# Patient Record
Sex: FEMALE | Race: WHITE | Employment: OTHER | URBAN - METROPOLITAN AREA
[De-identification: names, ages, dates, MRNs, and addresses within clinical notes are randomized per-mention and may not be internally consistent; named-entity substitution may affect disease eponyms.]

---

## 2017-05-09 ENCOUNTER — OFFICE VISIT (OUTPATIENT)
Dept: OBGYN CLINIC | Age: 72
End: 2017-05-09

## 2017-05-09 VITALS
BODY MASS INDEX: 28.16 KG/M2 | DIASTOLIC BLOOD PRESSURE: 90 MMHG | WEIGHT: 153 LBS | HEART RATE: 63 BPM | SYSTOLIC BLOOD PRESSURE: 141 MMHG | HEIGHT: 62 IN

## 2017-05-09 VITALS
HEIGHT: 62 IN | DIASTOLIC BLOOD PRESSURE: 90 MMHG | HEART RATE: 63 BPM | WEIGHT: 153 LBS | SYSTOLIC BLOOD PRESSURE: 140 MMHG | BODY MASS INDEX: 28.16 KG/M2

## 2017-05-09 DIAGNOSIS — N95.0 POSTMENOPAUSAL VAGINAL BLEEDING: Primary | ICD-10-CM

## 2017-05-09 DIAGNOSIS — N95.2 VAGINAL ATROPHY: ICD-10-CM

## 2017-05-09 DIAGNOSIS — E66.3 OVERWEIGHT (BMI 25.0-29.9): ICD-10-CM

## 2017-05-09 RX ORDER — LEVOTHYROXINE SODIUM 100 UG/1
TABLET ORAL
COMMUNITY

## 2017-05-09 RX ORDER — CALCIUM CARBONATE 600 MG
600 TABLET ORAL 2 TIMES DAILY
COMMUNITY
End: 2017-05-09

## 2017-05-09 RX ORDER — BISMUTH SUBSALICYLATE 262 MG
1 TABLET,CHEWABLE ORAL DAILY
COMMUNITY
End: 2017-05-09

## 2017-05-09 RX ORDER — GLUCOSAMINE SULFATE 1500 MG
POWDER IN PACKET (EA) ORAL DAILY
COMMUNITY
End: 2017-05-09

## 2017-05-09 RX ORDER — BISMUTH SUBSALICYLATE 262 MG
1 TABLET,CHEWABLE ORAL DAILY
COMMUNITY
End: 2018-02-12

## 2017-05-09 RX ORDER — LEVOTHYROXINE SODIUM 100 UG/1
TABLET ORAL
COMMUNITY
End: 2017-05-09

## 2017-05-09 NOTE — MR AVS SNAPSHOT
Visit Information Date & Time Provider Department Dept. Phone Encounter #  
 5/9/2017  1:00 PM [phone] [id number] Upcoming Health Maintenance Date Due Hepatitis C Screening 1945 BREAST CANCER SCRN MAMMOGRAM 8/12/1995 FOBT Q 1 YEAR AGE 50-75 8/12/1995 ZOSTER VACCINE AGE 60> 8/12/2005 OSTEOPOROSIS SCREENING (DEXA) 8/12/2010 INFLUENZA AGE 9 TO ADULT 8/1/2017 Allergies as of 5/9/2017  Review Complete On: 5/9/2017 By: Irma Perkins DO No Known Allergies Current Immunizations  Never Reviewed No immunizations on file. Not reviewed this visit Vitals BP Pulse Height(growth percentile) Weight(growth percentile) BMI OB Status 140/90 63 5' 2\" (1.575 m) 153 lb (69.4 kg) 27.98 kg/m2 Postmenopausal  
 Smoking Status Never Smoker BMI and BSA Data Body Mass Index Body Surface Area  
 27.98 kg/m 2 1.74 m 2 Preferred Pharmacy Pharmacy Name Phone [address] [place] [address] [phone] Your Updated Medication List  
  
   
This list is accurate as of: 5/9/17  1:53 PM.  Always use your most recent med list.  
  
  
  
  
 CALCIUM 600 + D 600-125 mg-unit Tab Generic drug:  calcium-cholecalciferol (d3) Take  by mouth.  
  
 levothyroxine 100 mcg tablet Commonly known as:  SYNTHROID Take  by mouth Daily (before breakfast). multivitamin tablet Commonly known as:  ONE A DAY Take 1 Tab by mouth daily. Introducing [facility]! [employer] introduces POPAPP patient portal. Now you can access parts of your medical record, email your doctor's office, and request medication refills online. 1. In your internet browser, go to [email or web address] Buzz All [email or web address]. [email or web address] Mediat 2. Click on the First Time User? Click Here link in the Sign In box. You will see the New Member Sign Up page. 3. Enter your Gullivearth Access Code exactly as it appears below. You will not need to use this code after youve completed the sign-up process. If you do not sign up before the expiration date, you must request a new code. · Gullivearth Access Code: [email or web address] Expires: 8/7/2017  9:59 AM 
 
4. Enter the last four digits of your Social Security Number (xxxx) and Date of Birth (mm/dd/yyyy) as indicated and click Submit. You will be taken to the next sign-up page. 5. Create a Gullivearth ID. This will be your Gullivearth login ID and cannot be changed, so think of one that is secure and easy to remember. 6. Create a Gullivearth password. You can change your password at any time. 7. Enter your Password Reset Question and Answer. This can be used at a later time if you forget your password. 8. Enter your e-mail address. You will receive e-mail notification when new information is available in [address]. 9. Click Sign Up. You can now view and download portions of your medical record. 10. Click the Download Summary menu link to download a portable copy of your medical information. If you have questions, please visit the Frequently Asked Questions section of the Gullivearth website. Remember, Gullivearth is NOT to be used for urgent needs. For medical emergencies, dial 911. Now available from your iPhone and Android! Please provide this summary of care documentation to your next provider. If you have any questions after today's visit, please call [phone].

## 2017-05-09 NOTE — PROGRESS NOTES
Mickey President is a 70 y.o. female referred by PMD for management of abnormal vaginal bleeding. Patient reports 2 mos of daily vaginal bleeding with wiping and sometimes seen in the toilet. Associated with small clots. Not enough to wear pad. No pelvic pain. Menopause in late 45s. No postcoital bleeding. Uses otc lubricant prn. Review of Systems:   General ROS: negative for - fever, chills, malaise  Endocrine ROS: negative for -  breast tenderness/mass/nipple discharge/galactorrhea, hair pattern changes, skin changes or temperature intolerance. Respiratory ROS: no cough, shortness of breath, or wheezing  Cardiovascular ROS: no chest pain or dyspnea on exertion  Gastrointestinal ROS: no abdominal pain, change in bowel habits, or black or bloody stools, nausea, vomiting  Genito-Urinary ROS: no dysuria, trouble voiding, incontinence or hematuria. No pelvic pain, unusual discharge, sx of pelvic organ prolapse. Positive for AUB as above and  vaginal dryness    Musculoskeletal ROS: negative  Neurological ROS: no TIA or stroke symptoms  Dermatological ROS: negative    OB History      Para Term  AB TAB SAB Ectopic Multiple Living    3 2 2  1  1           Gyn hx:  Menarche at age 15. Postmenopausal in 45s. No hx of abnormal pap. Last pap:  2016. Mammogram up to date. Colonoscopy due. History reviewed. No pertinent past medical history. History reviewed. No pertinent surgical history. Social History     Social History    Marital status:      Spouse name: N/A    Number of children: N/A    Years of education: N/A     Occupational History    Not on file.      Social History Main Topics    Smoking status: Never Smoker    Smokeless tobacco: Never Used    Alcohol use Yes    Drug use: Not on file    Sexual activity: Yes     Other Topics Concern    Not on file     Social History Narrative     No Known Allergies  Prior to Admission medications    Medication Sig Start Date End Date Taking? Authorizing Provider   levothyroxine (SYNTHROID) 100 mcg tablet Take  by mouth Daily (before breakfast). Yes Historical Provider   calcium-cholecalciferol, d3, (CALCIUM 600 + D) 600-125 mg-unit tab Take  by mouth. Yes Historical Provider   multivitamin (ONE A DAY) tablet Take 1 Tab by mouth daily. Yes Historical Provider        Objective:     Vitals:    05/09/17 1309   BP: 140/90   Pulse: 63   Weight: 153 lb (69.4 kg)   Height: 5' 2\" (1.575 m)     Body mass index is 27.98 kg/(m^2). Physical Exam:  General appearance - well appearing, and in no distress and well hydrated  Mental status - alert, oriented to person, place, and time, normal mood, behavior, speech, dress, motor activity, and thought processes  Respiratory:  Normal respiratory effort, no intercostal retractions or use of accessory muscles. Abdomen - soft, nontender, nondistended, no masses or organomegaly  Pelvic - No inguinal lymphadenopathy, normal urethral meatus and no bladder tenderness. VULVA: calcified varicosities on right labia, otherwise normal appearing vulva with no masses, tenderness or lesions, VAGINA: atrophic with scant rust-brown discharge, no lesions, PELVIC FLOOR EXAM: relaxed vaginal wall laterally. No uterine descent, cystocele and rectocele noted,  CERVIX: normal appearing cervix without lesions, scant rust-brown blood slowly oozing from endocervical canal UTERUS: uterus is normal size, shape, consistency and nontender, mobile, ADNEXA: non-palpable. Extremities - No edema. Skin - normal coloration and turgor, no rashes, no suspicious skin lesions noted    Assessment/Plan:       ICD-10-CM ICD-9-CM    1. Postmenopausal vaginal bleeding N95.0 627.1 US PELV NON OB W TV   2. Vaginal atrophy N95.2 627.3 US PELV NON OB W TV   3. Overweight (BMI 25.0-29. 9) E66.3 278.02 US PELV NON OB W TV     DDX of AUB using PALM COEIN classification discussed.  We focussed our discussion on hormonal fluctuations especially as it relates to obesity/peripheral conversion of estrogen and its association with endometrial hyperplasia/malignancy as well as structural abnormalities such as polyps/fibroids  Pelvic US and EMB recommended. We briefly discussed observation vs medical vs surgical management based on current symptoms.  Long term management will depend on results. Caloric restrictions and regular exercise strongly encouraged. Plan of care discussed. Patient expressed understanding and agreement.              Signed By:  Jer Wihte DO     May 9, 2017

## 2017-05-09 NOTE — PATIENT INSTRUCTIONS
Vaginal Bleeding After Menopause: Care Instructions  Your Care Instructions  Vaginal bleeding after menopause can have many causes, including infection, inflammation, prescription hormones, abnormal growths, and injury. Your doctor may want you to have more tests to find the cause of your vaginal bleeding. Follow-up care is a key part of your treatment and safety. Be sure to make and go to all appointments, and call your doctor if you are having problems. It's also a good idea to know your test results and keep a list of the medicines you take. How can you care for yourself at home? · If your doctor gave you medicine, take it exactly as prescribed. Call your doctor if you think you are having a problem with your medicine. · Do not have sex or put anything inside your vagina until you talk with your doctor. · Do not douche. When should you call for help? Call 911 anytime you think you may need emergency care. For example, call if:  · You passed out (lost consciousness). · You have sudden, severe pain in your belly or pelvis. Call your doctor now or seek immediate medical care if:  · You have severe vaginal bleeding. You are soaking through a pad each hour for 2 or more hours. · You are dizzy or lightheaded, or you feel like you may faint. · You have a fever. · You have new belly or pelvic pain. · You have vaginal discharge that smells bad. · You feel weak and tired, and your skin is pale. · Your bleeding gets worse. Watch closely for changes in your health, and be sure to contact your doctor if:  · You do not get better as expected. Where can you learn more? Go to http://aurelio-prema.info/. Enter N304 in the search box to learn more about \"Vaginal Bleeding After Menopause: Care Instructions. \"  Current as of: October 13, 2016  Content Version: 11.2  © 8530-8603 [employer].  Care instructions adapted under license by [employer] (which disclaims liability or warranty for this information). If you have questions about a medical condition or this instruction, always ask your healthcare professional. [place] [address] any warranty or liability for your use of this information.

## 2017-05-15 ENCOUNTER — HOSPITAL ENCOUNTER (OUTPATIENT)
Dept: ULTRASOUND IMAGING | Age: 72
Discharge: HOME | End: 2017-05-15
Attending: OBSTETRICS & GYNECOLOGY
Payer: MEDICARE

## 2017-05-15 DIAGNOSIS — N95.2 VAGINAL ATROPHY: ICD-10-CM

## 2017-05-15 DIAGNOSIS — N95.0 POSTMENOPAUSAL VAGINAL BLEEDING: ICD-10-CM

## 2017-05-15 DIAGNOSIS — E66.3 OVERWEIGHT (BMI 25.0-29.9): ICD-10-CM

## 2017-05-15 PROCEDURE — 76830 TRANSVAGINAL US NON-OB: CPT

## 2017-05-23 ENCOUNTER — OFFICE VISIT (OUTPATIENT)
Dept: OBGYN CLINIC | Age: 72
End: 2017-05-23

## 2017-05-23 ENCOUNTER — HOSPITAL ENCOUNTER (OUTPATIENT)
Dept: LAB | Age: 72
Discharge: HOME | End: 2017-05-23
Payer: MEDICARE

## 2017-05-23 VITALS
WEIGHT: 153 LBS | HEART RATE: 66 BPM | BODY MASS INDEX: 28.16 KG/M2 | HEIGHT: 62 IN | SYSTOLIC BLOOD PRESSURE: 164 MMHG | DIASTOLIC BLOOD PRESSURE: 82 MMHG

## 2017-05-23 DIAGNOSIS — N95.0 POSTMENOPAUSAL VAGINAL BLEEDING: Primary | ICD-10-CM

## 2017-05-23 DIAGNOSIS — R93.89 ENDOMETRIAL THICKENING ON ULTRA SOUND: ICD-10-CM

## 2017-05-23 PROCEDURE — 88305 TISSUE EXAM BY PATHOLOGIST: CPT | Performed by: OBSTETRICS & GYNECOLOGY

## 2017-05-23 NOTE — Clinical Note
Please call patient with pathology result when available. She still needs to come in to discuss result and plan in person.

## 2017-05-23 NOTE — MR AVS SNAPSHOT
Visit Information Date & Time Provider Department Dept. Phone Encounter #  
 5/23/2017  9:00 AM Achilles Lab, [phone] [id number] Follow-up Instructions Return in about 1 week (around 5/30/2017) for discuss EMB result and plan. Routing History Upcoming Health Maintenance Date Due Hepatitis C Screening 1945 BREAST CANCER SCRN MAMMOGRAM 8/12/1995 FOBT Q 1 YEAR AGE 50-75 8/12/1995 ZOSTER VACCINE AGE 60> 8/12/2005 OSTEOPOROSIS SCREENING (DEXA) 8/12/2010 INFLUENZA AGE 9 TO ADULT 8/1/2017 Allergies as of 5/23/2017  Review Complete On: 5/23/2017 By: Jaiden Ribeiro DO No Known Allergies Current Immunizations  Never Reviewed No immunizations on file. Not reviewed this visit You Were Diagnosed With   
  
 Codes Comments Postmenopausal vaginal bleeding    -  Primary ICD-10-CM: N95.0 ICD-9-CM: 627.1 Endometrial thickening on ultra sound     ICD-10-CM: R93.8 ICD-9-CM: 793.5 Vitals BP Pulse Height(growth percentile) Weight(growth percentile) BMI OB Status 164/82 66 5' 2\" (1.575 m) 153 lb (69.4 kg) 27.98 kg/m2 Postmenopausal  
 Smoking Status Never Smoker Vitals History BMI and BSA Data Body Mass Index Body Surface Area  
 27.98 kg/m 2 1.74 m 2 Preferred Pharmacy Pharmacy Name Phone [address] [place] [address] [phone] Your Updated Medication List  
  
   
This list is accurate as of: 5/23/17  9:44 AM.  Always use your most recent med list.  
  
  
  
  
 CALCIUM 600 + D 600-125 mg-unit Tab Generic drug:  calcium-cholecalciferol (d3) Take  by mouth.  
  
 levothyroxine 100 mcg tablet Commonly known as:  SYNTHROID Take  by mouth Daily (before breakfast). multivitamin tablet Commonly known as:  ONE A DAY Take 1 Tab by mouth daily. We Performed the Following BIOPSY OF UTERUS LINING [85112 CPT(R)] Follow-up Instructions Return in about 1 week (around 5/30/2017) for discuss EMB result and plan. Patient Instructions Endometrial Biopsy: About This Test 
What is it? An endometrial biopsy is a way for your doctor to take a small sample of the lining of the uterus (endometrium). The sample is looked at under a microscope for abnormal cells. An endometrial biopsy helps your doctor find problems in the endometrium. Why is this test done? An endometrial biopsy is done to check for cancer of the uterus. The test is also done if you have abnormal bleeding from your uterus or are having problems getting pregnant. The test results show how your body's hormones are affecting the lining of the uterus. How can you prepare for the test? 
Talk to your doctor about all your health conditions before the test. For example, tell your doctor if you: · Are or might be pregnant. An endometrial biopsy is not done during pregnancy. · Are taking any medicines. · Are allergic to any medicines. · Have had bleeding problems, or if you take aspirin or some other blood thinner. · Have been treated for an infection in your pelvic area. · Have any heart or lung problems. Other ways to prepare: · Do not douche, use tampons, or use vaginal medicines for 24 hours before the test. 
· Ask your doctor if you should take a pain reliever, such as ibuprofen (Advil or Motrin), 30 to 60 minutes before the test. This can help reduce any cramping pain that the test can cause. · Talk to your doctor if you have concerns about the need for the test, its risks, how it will be done, or what the results may mean. What happens before the test? 
· You will empty your bladder just before the test. 
· You will be asked to sign a consent form that says you understand the risks of the test and agree to have it done.  
What happens during the test? 
 · You will lie on an exam table. Your feet will be in stirrups. · The doctor may use a spray or injection to numb your cervix. The cervix is the opening to the uterus. · The doctor will use a tool called a speculum to see the cervix. · Then the doctor will pass a thin tube through the cervix to take a sample of the uterus lining. You may feel a sharp cramp when the doctor collects the sample. · The sample is sent to a lab. How long does the test take? The test will take about 5 to 15 minutes. What happens after the test? 
· You will probably be able to go home right away. · You likely will have mild vaginal bleeding and may have cramps for a few days after the test. The cramps may feel like bad menstrual cramps. · Ask your doctor if you can take an over-the-counter pain medicine, such as acetaminophen (Tylenol), ibuprofen (Advil, Motrin), or naproxen (Aleve). Be safe with medicines. Read and follow all instructions on the label. · Do not have sex, use tampons, or douche until the spotting stops. Use pads for vaginal bleeding or discharge. · Do not do strenuous exercise or heavy lifting for one day after your biopsy. Follow-up care is a key part of your treatment and safety. Be sure to make and go to all appointments, and call your doctor if you are having problems. It's also a good idea to keep a list of the medicines you take. Ask your doctor when you can expect to have your test results. Where can you learn more? Go to http://aurelio-prema.info/. Enter [phone] in the search box to learn more about \"Endometrial Biopsy: About This Test.\" Current as of: October 13, 2016 Content Version: 11.2 © 2726-1628 [employer]. Care instructions adapted under license by [employer] (which disclaims liability or warranty for this information).  If you have questions about a medical condition or this instruction, always ask your healthcare professional. [employer] disclaims any warranty or liability for your use of this information. Introducing [facility]! Dear Marcia Parker: Thank you for requesting a Mythos account. Our records indicate that you already have an active Mythos account. You can access your account anytime at [email or web address]. Activate Networks/Juventas Therapeutics Did you know that you can access your hospital and ER discharge instructions at any time in Mythos? You can also review all of your test results from your hospital stay or ER visit. Additional Information If you have questions, please visit the Frequently Asked Questions section of the Mythos website at [email or web address] Activate Networks/[email or web address] Therapeutics/. Remember, Mythos is NOT to be used for urgent needs. For medical emergencies, dial 911. Now available from your iPhone and Android! Please provide this summary of care documentation to your next provider. Your primary care clinician is listed as Gianfranco Granger. If you have any questions after today's visit, please call [phone].

## 2017-05-23 NOTE — PROGRESS NOTES
Patient here with  to discuss US findings prior to EMB. Reports continued bleeding but now scant, brown in color. No pelvic pain. No dizziness. ROS significant for above. Past Medical History:   Diagnosis Date    Endometrial thickening on ultra sound 5/23/2017    Postmenopausal vaginal bleeding 5/9/2017    Vaginal atrophy 5/9/2017     Visit Vitals    /82    Pulse 66    Ht 5' 2\" (1.575 m)    Wt 153 lb (69.4 kg)    BMI 27.98 kg/m2     Gen:  NAD  I personally reviewed pelvic US report and images with patient and spouse, significant for thickened endometrium 1.2 cm without increased vascularity. Normal size uterus and adnexa. A/P:  >50% of 15 minute visit spent counseling on     ICD-10-CM ICD-9-CM    1. Postmenopausal vaginal bleeding N95.0 627.1 SURGICAL PATHOLOGY      BIOPSY OF UTERUS LINING   2. Endometrial thickening on ultra sound R93.8 793.5 BIOPSY OF UTERUS LINING     Lengthy discussion re: EMB procedure and associated risks, normal EM lining in postmenopausal state, intracavitary lesion (suspect polyp) needing tissue diagnosis prior to surgical excision. Recommend coming back in person to discuss result in order to fully answer all questions. Patient spouse insisting a phone call with result prior to coming in. I advised it would be difficult to answer all questions in a timely manner over the phone and working around busy office hours. Patient requesting a phone call with result to her  to ease his mind with plans of coming in to discuss findings and surgical planning. We briefly discussed possibility of hysteroscopic polypectomy vs RTLH/BSO with benign pathology. Plan of care discussed. Patient expressed understanding and agreement.

## 2017-05-23 NOTE — PROCEDURES
ENDOMETRIAL BIOPSY (31337): Indication:  70 y.o. with postmenopausal bleeding found to have thickened endometrium on US. R/B/A to EMB reviewed. Chart reviewed for the following:   Cele MACIAS DO, have reviewed the History, Physical and updated the Allergic reactions for Hameed     TIME OUT performed immediately prior to start of procedure:   Cele MACIAS DO, have performed the following reviews on Hameed prior to the start of the procedure:            * Patient was identified by name and date of birth   * Agreement on procedure being performed was verified  * Risks and Benefits explained to the patient  * Procedure site verified and marked as necessary  * Patient was positioned for comfort  * Consent was signed and verified     Time: 0922      Date of procedure: 5/23/2017    Procedure performed by:  Cele Burdick DO    Provider assisted by: Harjinder Covarrubias LPN    Patient assisted by: self    How tolerated by patient: tolerated the procedure well with no complications       Procedure:    After informed consent, the patient was placed in dorsal lithotomy position. A lighted speculum inserted vaginally. Exam revealead smooth cervix without lesions. Mucus was wiped with scopette. Betadine swabs used to wipe the cervix 3 times. A single tooth tinaculum was used to grasp the anterior lip of the cervix. The cervix did not require dilation. The fundus sounded to 5 cm. The endometrial pipelle was inserted and suction applied. This was performed 3 times. The speculum was removed. The patient tolerated the procedure well. Disposition: Follow up in 1-2 weeks to discuss result and plan of care. Instructions provided. Plan of care discussed. Patient expressed understanding and agreement.       Zenon Medina DO  05/23/17

## 2017-05-23 NOTE — PATIENT INSTRUCTIONS
Endometrial Biopsy: About This Test  What is it? An endometrial biopsy is a way for your doctor to take a small sample of the lining of the uterus (endometrium). The sample is looked at under a microscope for abnormal cells. An endometrial biopsy helps your doctor find problems in the endometrium. Why is this test done? An endometrial biopsy is done to check for cancer of the uterus. The test is also done if you have abnormal bleeding from your uterus or are having problems getting pregnant. The test results show how your body's hormones are affecting the lining of the uterus. How can you prepare for the test?  Talk to your doctor about all your health conditions before the test. For example, tell your doctor if you:  · Are or might be pregnant. An endometrial biopsy is not done during pregnancy. · Are taking any medicines. · Are allergic to any medicines. · Have had bleeding problems, or if you take aspirin or some other blood thinner. · Have been treated for an infection in your pelvic area. · Have any heart or lung problems. Other ways to prepare:  · Do not douche, use tampons, or use vaginal medicines for 24 hours before the test.  · Ask your doctor if you should take a pain reliever, such as ibuprofen (Advil or Motrin), 30 to 60 minutes before the test. This can help reduce any cramping pain that the test can cause. · Talk to your doctor if you have concerns about the need for the test, its risks, how it will be done, or what the results may mean. What happens before the test?  · You will empty your bladder just before the test.  · You will be asked to sign a consent form that says you understand the risks of the test and agree to have it done. What happens during the test?  · You will lie on an exam table. Your feet will be in stirrups. · The doctor may use a spray or injection to numb your cervix. The cervix is the opening to the uterus.   · The doctor will use a tool called a speculum to see the cervix. · Then the doctor will pass a thin tube through the cervix to take a sample of the uterus lining. You may feel a sharp cramp when the doctor collects the sample. · The sample is sent to a lab. How long does the test take? The test will take about 5 to 15 minutes. What happens after the test?  · You will probably be able to go home right away. · You likely will have mild vaginal bleeding and may have cramps for a few days after the test. The cramps may feel like bad menstrual cramps. · Ask your doctor if you can take an over-the-counter pain medicine, such as acetaminophen (Tylenol), ibuprofen (Advil, Motrin), or naproxen (Aleve). Be safe with medicines. Read and follow all instructions on the label. · Do not have sex, use tampons, or douche until the spotting stops. Use pads for vaginal bleeding or discharge. · Do not do strenuous exercise or heavy lifting for one day after your biopsy. Follow-up care is a key part of your treatment and safety. Be sure to make and go to all appointments, and call your doctor if you are having problems. It's also a good idea to keep a list of the medicines you take. Ask your doctor when you can expect to have your test results. Where can you learn more? Go to http://aurelio-prema.info/. Enter [phone] in the search box to learn more about \"Endometrial Biopsy: About This Test.\"  Current as of: October 13, 2016  Content Version: 11.2  © 4649-7562 [employer]wise, [employer]orporated. Care instructions adapted under license by [employer] (which disclaims liability or warranty for this information). If you have questions about a medical condition or this instruction, always ask your healthcare professional. [address] any warranty or liability for your use of this information.

## 2017-05-25 ENCOUNTER — OFFICE VISIT (OUTPATIENT)
Dept: OBGYN CLINIC | Age: 72
End: 2017-05-25

## 2017-05-25 VITALS
WEIGHT: 153 LBS | DIASTOLIC BLOOD PRESSURE: 87 MMHG | BODY MASS INDEX: 28.16 KG/M2 | SYSTOLIC BLOOD PRESSURE: 146 MMHG | HEIGHT: 62 IN | HEART RATE: 67 BPM

## 2017-05-25 DIAGNOSIS — C54.1 ENDOMETRIAL CANCER (HCC): Primary | ICD-10-CM

## 2017-05-25 NOTE — PROGRESS NOTES
Received call from Pathologist Miriam Sapp) today advising that findings consistent with early stage endometrial cancer. LPN to notify patient as requested. In-office consultation recommended to discuss findings and management plan in detail. Refer to Dominick May.

## 2017-05-25 NOTE — PROGRESS NOTES
Patient presents with spouse to discuss EMB result. Reports light vaginal bleeding after EMB. No c/o pelvic pain today. Past Medical History:   Diagnosis Date    Endometrial thickening on ultra sound 5/23/2017    Postmenopausal vaginal bleeding 5/9/2017    Vaginal atrophy 5/9/2017     Visit Vitals    /87    Pulse 67    Ht 5' 2\" (1.575 m)    Wt 153 lb (69.4 kg)    BMI 27.98 kg/m2     Gen:  NAD  I personally reviewed EMB pathology report with them, significant for endometroid adenocarcinoma, FIGO 1.    Pelvic deferred. A/P:  >50% of 15 minute visit spent counseling on     ICD-10-CM ICD-9-CM    1. Endometrial cancer (HCC) C54.1 182.0 REFERRAL TO GYN ONCOLOGY    endometriod adenocarcinoma, FIGO 1     Recommended referral to Gyn Onc ASAP. An appointment has been secured for Tues, 5/30, at 1230. Information provided. General management (surgical +/- chemo or radiation) discussed. Advised detailed information and recommendation will be discussed during her gyn onc consultation. All questions answered to their satisfaction. I wished them well. Plan of care discussed. Patient expressed understanding and agreement.

## 2017-05-25 NOTE — PATIENT INSTRUCTIONS
Uterine Cancer: Care Instructions  Your Care Instructions  Uterine cancer is the rapid growth of abnormal cells that line the uterus. It also is called endometrial cancer. These cells may spread to nearby organs, lymph glands, or distant organs. Uterine cancer can be cured most often when found early. Treatment may include surgery to remove the uterus, ovaries, fallopian tubes, and sometimes the pelvic lymph nodes. Radiation and hormones to stop cancer growth also are sometimes used. Chemotherapy may be used if the cancer has spread. Having cancer can be scary. You may feel many emotions and may need some help coping. Seek out family, friends, and counselors for support. You can do things at home to make yourself feel better while you go through treatment. You also can call the [employer] (5-937.393.2236) or visit its website at [employer] for more information. Follow-up care is a key part of your treatment and safety. Be sure to make and go to all appointments, and call your doctor if you are having problems. It's also a good idea to know your test results and keep a list of the medicines you take. How can you care for yourself at home? · Take your medicines exactly as prescribed. Call your doctor if you think you are having a problem with your medicine. You may get medicine for nausea and vomiting if you have these side effects. · Eat healthy food. If you do not feel like eating, try to eat food that has protein and extra calories to keep up your strength and prevent weight loss. Drink liquid meal replacements for extra calories and protein. Try to eat your main meal early. · Get some physical activity every day, but do not get too tired. Keep doing the hobbies you enjoy as your energy allows. · Take steps to control your stress and workload. Learn relaxation techniques. ¨ Share your feelings. Stress and tension affect our emotions.  By expressing your feelings to others, you may be able to understand and cope with them. ¨ Consider joining a support group. Talking about a problem with your spouse, a good friend, or other people with similar problems is a good way to reduce tension and stress. ¨ Express yourself through art. Try writing, dance, art, or crafts to relieve tension. Some dance, writing, or art groups may be available just for people who have cancer. ¨ Be kind to your body and mind. Getting enough sleep, eating a healthy diet, and taking time to do things you enjoy can contribute to an overall feeling of balance in your life and help reduce stress. ¨ Get help if you need it. Discuss your concerns with your doctor or counselor. · If you are vomiting or have diarrhea:  ¨ Drink plenty of fluids (enough so that your urine is light yellow or clear like water) to prevent dehydration. Choose water and other caffeine-free clear liquids. If you have kidney, heart, or liver disease and have to limit fluids, talk with your doctor before you increase the amount of fluids you drink. ¨ When you are able to eat, try clear soups, mild foods, and liquids until all symptoms are gone for 12 to 48 hours. Other good choices include dry toast, crackers, cooked cereal, and gelatin dessert, such as Jell-O.  · Take care of your urinary tract to prevent problems such as infection, which can be caused by uterine cancer and its treatment. Limit drinks with caffeine, drink plenty of fluids, and urinate every 3 to 4 hours. · If you have not already done so, prepare a list of advance directives. Advance directives are instructions to your doctor and family members about what kind of care you want if you become unable to speak or express yourself. When should you call for help? Call 911 anytime you think you may need emergency care. For example, call if:  · You passed out (lost consciousness). · You have severe belly pain.   Call your doctor now or seek immediate medical care if:  · You have severe vaginal bleeding. You are passing blood clots and soaking through a pad each hour for 2 or more hours. · You have belly pain. · You are dizzy or lightheaded, or you feel like you may faint. · You have a fever. · You have severe constipation. · You stop urinating. · You have severe vomiting that you cannot control. Watch closely for changes in your health, and be sure to contact your doctor if:  · You have spotting of blood from your vagina. · You feel very sad, anxious or both. Where can you learn more? Go to http://aurelio-prema.info/. Enter E343 in the search box to learn more about \"Uterine Cancer: Care Instructions. \"  Current as of: November 28, 2016  Content Version: 11.2  © 1068-5676 [employer]. Care instructions adapted under license by [employer] (which disclaims liability or warranty for this information). If you have questions about a medical condition or this instruction, always ask your healthcare professional. [address] any warranty or liability for your use of this information.

## 2017-12-12 ENCOUNTER — IMPORTED ENCOUNTER (OUTPATIENT)
Dept: URBAN - METROPOLITAN AREA CLINIC 1 | Facility: CLINIC | Age: 72
End: 2017-12-12

## 2017-12-12 PROBLEM — H04.123: Noted: 2017-12-12

## 2017-12-12 PROBLEM — H43.811: Noted: 2017-12-12

## 2017-12-12 PROBLEM — H40.013: Noted: 2017-12-12

## 2017-12-12 PROBLEM — H25.813: Noted: 2017-12-12

## 2017-12-12 PROCEDURE — 92015 DETERMINE REFRACTIVE STATE: CPT

## 2017-12-12 PROCEDURE — 92004 COMPRE OPH EXAM NEW PT 1/>: CPT

## 2017-12-12 NOTE — PATIENT DISCUSSION
Patient educated with the Basic+ option, they will most likely need prescription glasses at all focal points after surgery. Patient elects Basic+ OD, goal of emmetropia.

## 2017-12-12 NOTE — PATIENT DISCUSSION
1.  Cataract OU:  Visually Significant discussed the risks benefits alternatives and limitations of cataract surgery. The patient stated a full understanding and a desire to proceed with the procedure. The patient will need to return for preop appointment with cataract measurements and to have any additional questions answered and start pre-operative eye drops as directed. Hx of mono VA with toric CLS. Good candidate for toric IOL with mono OD distant. Patient did not see PMG todayPhaco PCLOtherwise follow-up2. Dry Eyes OU -- Recommended to patient to use Artificial Tears BID OU3. PVD w/o Tear OD - Patient was cautioned to call our office immediately if they experience   a substantial change in their symptoms such as an increase in floaters persistent flashes loss of visual field (may appear as a shadow or a curtain) or decrease in visual acuity as these may indicate a retinal tear or detachment. 4.  COAG suspect OU: (0.60/0.60)  IOP was 16/17.  -FM HX. Condition was discussed with patient. Will monitor patient for progression.  5.  Return for an appointment for H and P with Dr. Akil Hudson

## 2018-01-23 NOTE — PATIENT DISCUSSION
Same day PO: Patient is doing well post-operatively. The importance of post-op drop compliance was emphasized. Drop schedule reviewed with patient. Patient to call if any visual changes or concerns.

## 2018-01-30 NOTE — PATIENT DISCUSSION
pt elects Basic Plus, goal yas.  Ok to proceed wtih IOL OS due to FD OS. ,   Dec for Sx OS today.  pt accepts need for spectacles at all focal points.

## 2018-02-02 ENCOUNTER — IMPORTED ENCOUNTER (OUTPATIENT)
Dept: URBAN - METROPOLITAN AREA CLINIC 1 | Facility: CLINIC | Age: 73
End: 2018-02-02

## 2018-02-02 PROBLEM — H40.023: Noted: 2018-02-02

## 2018-02-02 PROBLEM — H04.123: Noted: 2018-02-02

## 2018-02-02 PROBLEM — H16.143: Noted: 2018-02-02

## 2018-02-02 PROBLEM — H25.813: Noted: 2018-02-02

## 2018-02-02 PROBLEM — H43.811: Noted: 2018-02-02

## 2018-02-02 PROCEDURE — 92136 OPHTHALMIC BIOMETRY: CPT

## 2018-02-02 PROCEDURE — 92012 INTRM OPH EXAM EST PATIENT: CPT

## 2018-02-02 NOTE — PATIENT DISCUSSION
1.  Cataract OU:  Visually Significant secondary to glare discussed the risks benefits alternatives and limitations of cataract surgery. The patient stated a full understanding and a desire to proceed with the procedure. The patient will need to start pre-operative eye drops as directed. **Thoroughly discussed expectations post phaco w/ standard lens vs. astigmatism correcting lens. Pt understands she will need glasses or CTLs post-op to achieve their best corrected vision. Proceed w/ phaco PCL OS then OD2. ROGELIO w/ PEK OU- Stable. The continuation of artificial tears were recommended. 3.  PVD w/o Tear OD - Old stable. 4.  Glaucoma Suspect OU (0.75 OU): Stable IOP OU. W/u after phaco. Patient is considered high risk. Condition was discussed with patient and patient understands. 5.  Return for an appointment for sx OS with Dr. Ariane Wallace.

## 2018-02-14 ENCOUNTER — IMPORTED ENCOUNTER (OUTPATIENT)
Dept: URBAN - METROPOLITAN AREA CLINIC 1 | Facility: CLINIC | Age: 73
End: 2018-02-14

## 2018-02-14 PROBLEM — H25.812 COMBINED FORM OF SENILE CATARACT OF LEFT EYE: Status: RESOLVED | Noted: 2018-02-14 | Resolved: 2018-02-14

## 2018-02-14 PROBLEM — H25.812 COMBINED FORM OF SENILE CATARACT OF LEFT EYE: Status: ACTIVE | Noted: 2018-02-14

## 2018-02-15 ENCOUNTER — IMPORTED ENCOUNTER (OUTPATIENT)
Dept: URBAN - METROPOLITAN AREA CLINIC 1 | Facility: CLINIC | Age: 73
End: 2018-02-15

## 2018-02-15 PROBLEM — Z96.1: Noted: 2018-02-15

## 2018-02-15 PROCEDURE — 99024 POSTOP FOLLOW-UP VISIT: CPT

## 2018-02-15 NOTE — PATIENT DISCUSSION
1. POD#1 CE/IOL OS (Standard) doing well. 1 gtt Combigan instilled OS Continue all 3 gtts as prescribed and until gone. Prednisolone BID OS Acular Qdaily OS Ocuflox TID OS Post op Warnings Reiterated RTC as scheduled

## 2018-02-22 ENCOUNTER — IMPORTED ENCOUNTER (OUTPATIENT)
Dept: URBAN - METROPOLITAN AREA CLINIC 1 | Facility: CLINIC | Age: 73
End: 2018-02-22

## 2018-02-22 PROCEDURE — 92136 OPHTHALMIC BIOMETRY: CPT

## 2018-02-28 ENCOUNTER — IMPORTED ENCOUNTER (OUTPATIENT)
Dept: URBAN - METROPOLITAN AREA CLINIC 1 | Facility: CLINIC | Age: 73
End: 2018-02-28

## 2018-02-28 PROBLEM — H25.811 COMBINED FORM OF SENILE CATARACT OF RIGHT EYE: Status: ACTIVE | Noted: 2018-02-28

## 2018-02-28 PROBLEM — H25.811 COMBINED FORM OF SENILE CATARACT OF RIGHT EYE: Status: RESOLVED | Noted: 2018-02-28 | Resolved: 2018-02-28

## 2018-03-01 ENCOUNTER — IMPORTED ENCOUNTER (OUTPATIENT)
Dept: URBAN - METROPOLITAN AREA CLINIC 1 | Facility: CLINIC | Age: 73
End: 2018-03-01

## 2018-03-01 PROBLEM — Z96.1: Noted: 2018-03-01

## 2018-03-01 PROCEDURE — 99024 POSTOP FOLLOW-UP VISIT: CPT

## 2018-03-01 NOTE — PATIENT DISCUSSION
1. POD#1 CE/IOL Standard OD doing well. Continue all 3 gtts as prescribed and until gone. Ocuflox TIDPrednisolone BIDAcular QDPost op Warnings Reiterated  2. POW#2  CE/IOL Standard OS doing well. Continue Prednisolone BID until gone. Continue Acular QD until gone. 3.   RTC as scheduled for KR OU

## 2018-04-09 ENCOUNTER — IMPORTED ENCOUNTER (OUTPATIENT)
Dept: URBAN - METROPOLITAN AREA CLINIC 1 | Facility: CLINIC | Age: 73
End: 2018-04-09

## 2018-04-09 PROBLEM — Z96.1: Noted: 2018-04-09

## 2018-04-09 PROCEDURE — 99024 POSTOP FOLLOW-UP VISIT: CPT

## 2018-04-09 PROCEDURE — 92015 DETERMINE REFRACTIVE STATE: CPT

## 2018-04-09 NOTE — PATIENT DISCUSSION
POW#3 Phaco/ PCL OU (Standard OU) OS Near [employer]. Finish PO meds per schedule MRX for glasses givenReturn for an appointment in November 30 OCT with Dr. Davian Muro. Return for an appointment in as needed with Dr. Mikey Oropeza.

## 2018-08-02 NOTE — PROGRESS NOTES
Patient rescheduled due to physician call schedule Altered mental status, unspecified altered mental status type

## 2018-11-28 ENCOUNTER — IMPORTED ENCOUNTER (OUTPATIENT)
Dept: URBAN - METROPOLITAN AREA CLINIC 1 | Facility: CLINIC | Age: 73
End: 2018-11-28

## 2018-11-28 PROBLEM — H16.143: Noted: 2018-11-28

## 2018-11-28 PROBLEM — H43.811: Noted: 2018-11-28

## 2018-11-28 PROBLEM — H40.013: Noted: 2018-11-28

## 2018-11-28 PROBLEM — Z96.1: Noted: 2018-11-28

## 2018-11-28 PROBLEM — H04.123: Noted: 2018-11-28

## 2018-11-28 PROCEDURE — 92014 COMPRE OPH EXAM EST PT 1/>: CPT

## 2018-11-28 PROCEDURE — 92133 CPTRZD OPH DX IMG PST SGM ON: CPT

## 2018-11-28 NOTE — PATIENT DISCUSSION
1.  Glaucoma Suspect OU (CD: 0.60 OU) -- IOP stable today at 15 / 14. Negative family h/o Glaucoma. OCT today shows minimal thinning OD and WNL OS. Patient is considered Low Risk. Condition was discussed with patient and patient understands. Will continue to monitor patient for any progression in condition. Patient was advised to call us with any problems questions or concerns. 2.  ROGELIO w/ PEK OU -- Recommend continue the frequent use of OTC AT's BID-QID OU. 3.  PVD w/o Tear OD -- Old Stable. RD precautions given. 4. Pseudophakia OU (Standard w/ PMG OU) -- Doing well. Patient defers the refraction at today's visit. Return for an appointment in 1 YR for a 27 / OCT OU with Dr. Albina Rizzo.

## 2019-08-26 NOTE — PATIENT DISCUSSION
The patient feels that the cataract is significantly impacting daily activities and has elected cataract surgery. The risks, benefits, and alternatives to surgery were discussed. The patient elects to proceed with surgery. 26-Aug-2019 11:29

## 2019-11-27 ENCOUNTER — IMPORTED ENCOUNTER (OUTPATIENT)
Dept: URBAN - METROPOLITAN AREA CLINIC 1 | Facility: CLINIC | Age: 74
End: 2019-11-27

## 2019-11-27 PROBLEM — H16.143: Noted: 2019-11-27

## 2019-11-27 PROBLEM — H40.013: Noted: 2019-11-27

## 2019-11-27 PROBLEM — H43.811: Noted: 2019-11-27

## 2019-11-27 PROBLEM — H04.123: Noted: 2019-11-27

## 2019-11-27 PROBLEM — Z96.1: Noted: 2019-11-27

## 2019-11-27 PROCEDURE — 92133 CPTRZD OPH DX IMG PST SGM ON: CPT

## 2019-11-27 PROCEDURE — 92014 COMPRE OPH EXAM EST PT 1/>: CPT

## 2019-11-27 NOTE — PATIENT DISCUSSION
1.  Glaucoma Suspect OU (CD: 0.60 OU) -- IOP stable today at 15 OU. Negative family h/o Glaucoma. OCT today stable; shows minimal thinning OD and WNL OS. Patient is considered Low Risk. Condition was discussed with patient and patient understands. Will continue to monitor patient for any progression in condition. Patient was advised to call us with any problems questions or concerns. 2.  ROGELIO w/ PEK OU -- Recommend continue the frequent use of OTC AT's BID-QID OU. 3.  PVD w/o Tear OD -- Old Stable. RD precautions given. 4. Pseudophakia OU (Standard w/ PMG OU) -- Doing well. Patient defers the refraction at today's visit. Return for an appointment in 1 year for a 30 with Dr. José Luis London.

## 2020-07-21 NOTE — PATIENT DISCUSSION
Patient was admitted to the hospital a transfer from [facility] on 07/29/2020 in the morning due to new onset of slurred speech and facial numbness.    Patient was seen and re-evaluated in the morning.  Medical records were reviewed.    Assessment and plan.  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  Patient admitted per stroke protocol.  Started on aspirin, statins.  PT/OT/ST.  Sinus rhythm, no arrhythmias observed.  2D echo was obtained, but no bubble study.  Discussed with neurology-recommending HANY.  Discussed with cardiologist plan for HANY on 07/22/2020.  Continue blood pressure control.  May need prolonged Holter versus loop recorder upon discharge per recommendation from Neurology.  · Hypertensive emergency.  Blood pressure up to 185/107 on admission.  Trending down now.  Well controlled.  · Demand ischemia.  Noted mildly elevated troponin that is likely demand ischemia secondary to blood pressure elevation.  Troponins trended down.  No signs of angina.  · DVT prophylaxis with subcutaneous Lovenox.    Kaden Daniels MD.  07/21/2020.     The types of intraocular lenses were reviewed with the patient along with a discussion of their various strengths and weaknesses.

## 2020-11-30 ENCOUNTER — IMPORTED ENCOUNTER (OUTPATIENT)
Dept: URBAN - METROPOLITAN AREA CLINIC 1 | Facility: CLINIC | Age: 75
End: 2020-11-30

## 2020-11-30 PROBLEM — H26.493: Noted: 2020-11-30

## 2020-11-30 PROBLEM — H16.143: Noted: 2020-11-30

## 2020-11-30 PROBLEM — H40.013: Noted: 2020-11-30

## 2020-11-30 PROBLEM — H04.123: Noted: 2020-11-30

## 2020-11-30 PROBLEM — Z96.1: Noted: 2020-11-30

## 2020-11-30 PROBLEM — H43.811: Noted: 2020-11-30

## 2020-11-30 PROCEDURE — 92014 COMPRE OPH EXAM EST PT 1/>: CPT

## 2020-11-30 NOTE — PATIENT DISCUSSION
1.  ROGELIO w/ PEK OU- Recommend ATs TID OU routinely 2. PCO OU: (Posterior Capsule Opacification)   Observe and consider yag cap when pt feels pco visually significant and visual acuity decreases to appropriate level. 3. Pseudophakia OU - (Standard OU Monovision - OS Near) 4. Glaucoma Suspect OU (CD 0.60 OU): Past w/u negative. IOP stable. Negative family hx. Patient is considered Low Risk. Condition was discussed with patient and patient understands. Will continue to monitor patient for any progression in condition. Patient was advised to call us with any problems questions or concerns. 5.  PVD w/o Tear OD - RD precautions. Patient deferred Manifest Rx today. Return for an appointment in 6 months 10/DFE/glare with Dr. Manuel Foreman.

## 2021-03-01 ENCOUNTER — IMPORTED ENCOUNTER (OUTPATIENT)
Dept: URBAN - METROPOLITAN AREA CLINIC 1 | Facility: CLINIC | Age: 76
End: 2021-03-01

## 2021-03-01 PROBLEM — H10.45: Noted: 2021-03-01

## 2021-03-01 PROCEDURE — 99213 OFFICE O/P EST LOW 20 MIN: CPT

## 2021-03-01 NOTE — PATIENT DISCUSSION
1.  Allergic Conjunctivitis OS -- Likely secondary to getting Cajun seasoning into left eye. Begin Alrex TID OS only (Sample Given). 1 gtt instilled in office. Recommended cool compresses TID x 5 minutes. Pt may take Benadryl to relieve potential allergy. 2.  ROGELIO w/ PEK OU -- Cont the use of ATs TID OU routinely. 3. PCO OU (Posterior Capsule Opacification) -- Observe. 4.  Pseudophakia OU -- (Standard OU Monovision - OS Near) 5. Glaucoma Suspect OU -- (C/D 0.60 OU) IOP stable. (-) Family Hx. Past w/u negative. 6.  H/o PVD w/o Tear OD Return for an appointment as scheduled (30) with Dr. Shazia Howell.

## 2021-03-01 NOTE — PATIENT DISCUSSION
Allergic Conjunctivitis OS:  Condition discussed with patient. Advised patient to use OTC Zaditor one drop OS BID prn.

## 2021-06-01 ENCOUNTER — IMPORTED ENCOUNTER (OUTPATIENT)
Dept: URBAN - METROPOLITAN AREA CLINIC 1 | Facility: CLINIC | Age: 76
End: 2021-06-01

## 2021-06-01 PROBLEM — H26.493: Noted: 2021-06-01

## 2021-06-01 PROBLEM — H04.123: Noted: 2021-06-01

## 2021-06-01 PROBLEM — H16.143: Noted: 2021-06-01

## 2021-06-01 PROCEDURE — 99213 OFFICE O/P EST LOW 20 MIN: CPT

## 2021-12-06 ENCOUNTER — IMPORTED ENCOUNTER (OUTPATIENT)
Dept: URBAN - METROPOLITAN AREA CLINIC 1 | Facility: CLINIC | Age: 76
End: 2021-12-06

## 2021-12-06 PROBLEM — H26.493: Noted: 2021-12-06

## 2021-12-06 PROBLEM — H16.143: Noted: 2021-12-06

## 2021-12-06 PROBLEM — H04.123: Noted: 2021-12-06

## 2021-12-06 PROCEDURE — 99214 OFFICE O/P EST MOD 30 MIN: CPT

## 2021-12-06 PROCEDURE — 92015 DETERMINE REFRACTIVE STATE: CPT

## 2021-12-06 NOTE — PATIENT DISCUSSION
1.  ROGELIO w/ PEK OU - Recommend the use of ATs TID OU routinely. 2. PCO OD>OS (Posterior Capsule Opacification) - Visually Significant *secondary to glare*; schedule YAG Cap OD then OS. Pros cons risks and benefits. 3.  Pseudophakia OU - (Standard OU Monovision - OS Near) 4. Glaucoma Suspect OU (CD 0.30 OU): Past w/u negative. IOP stable. Negative family hx. Patient is considered Low Risk. Condition was discussed with patient and patient understands. Will continue to monitor patient for any progression in condition. Patient was advised to call us with any problems questions or concerns. 5.  PVD w/o Tear OD - old/stable. Return for an appointment in 3-6 weeks for YAG Cap OD then YAG cap OS  with Dr. Jose Luis Hughes.

## 2022-02-04 ENCOUNTER — IMPORTED ENCOUNTER (OUTPATIENT)
Dept: URBAN - METROPOLITAN AREA CLINIC 1 | Facility: CLINIC | Age: 77
End: 2022-02-04

## 2022-02-04 PROBLEM — H26.491: Noted: 2022-02-04

## 2022-02-04 PROCEDURE — 66821 AFTER CATARACT LASER SURGERY: CPT

## 2022-02-04 NOTE — PATIENT DISCUSSION
YAG CAP OD: (Consent signed and scanned into attachments) 1 gtt Prolensa applied. The purpose and nature of the procedure possible alternative methods of treatment the risks involved and the possibility of complications were discussed with patient. The Patient wishes to proceed and the consent was signed. The laser was then performed under topical anesthesia with no complications. Post op instructions were given to patient as well as a follow-up appointment. Patient was advised to call our office if any questions or concerns. Return for an appointment as scheduled with Dr. Brittney Starks.

## 2022-02-18 ENCOUNTER — CLINIC PROCEDURE ONLY (OUTPATIENT)
Dept: URBAN - METROPOLITAN AREA CLINIC 1 | Facility: CLINIC | Age: 77
End: 2022-02-18

## 2022-02-18 DIAGNOSIS — H26.492: ICD-10-CM

## 2022-02-18 PROCEDURE — 66821 AFTER CATARACT LASER SURGERY: CPT

## 2022-02-18 NOTE — PROCEDURE NOTE: CLINICAL
PROCEDURE NOTE: YAG Capsulotomy OS. Diagnosis: Posterior Capsular Opacity. Anesthesia: Topical. The purpose and nature of the procedure, possible alternative methods of treatment, the risks involved and the possibility of complications were discussed with patient. The Patient wishes to proceed and the consent was signed. 1 gtt Prolensa applied. The laser was then performed under topical anesthesia with no complications. Post op instructions were given to patient as well as a follow-up appointment. Patient was advised to call our office if any questions or concerns. José Miguel Jefferson

## 2022-03-29 ENCOUNTER — POST-OP (OUTPATIENT)
Dept: URBAN - METROPOLITAN AREA CLINIC 1 | Facility: CLINIC | Age: 77
End: 2022-03-29

## 2022-03-29 DIAGNOSIS — Z98.890: ICD-10-CM

## 2022-03-29 PROCEDURE — 99024 POSTOP FOLLOW-UP VISIT: CPT

## 2022-03-29 ASSESSMENT — VISUAL ACUITY
OD_SC: 20/20
OS_SC: 20/30
OS_SC: J1+

## 2022-03-29 NOTE — PATIENT DISCUSSION
(CD 0.30 OU) - Past w/u negative. IOP stable. Patient is considered low risk. Condition was discussed with patient and patient understands. Will continue to monitor patient for any progression in condition. Patient was advised to call us with any problems, questions, or concerns.

## 2022-04-02 ASSESSMENT — VISUAL ACUITY
OD_GLARE: 20/60
OS_CC: 20/50
OD_CC: 20/20
OS_SC: J1
OD_CC: 20/25
OD_GLARE: 20/30
OD_GLARE: 20/50
OD_CC: 20/20-2
OD_CC: 20/20
OS_SC: J1
OS_SC: J2
OS_CC: 20/70
OS_SC: J1-1
OD_SC: 20/25
OS_SC: J1
OS_SC: J3
OD_CC: 20/20-1
OS_GLARE: 20/30
OD_SC: J3
OS_SC: J2
OS_SC: J3
OS_CC: 20/40
OD_CC: 20/30
OS_GLARE: 20/60
OS_SC: J2
OS_GLARE: 20/60
OD_SC: 20/25
OS_GLARE: 20/60
OS_SC: J1
OS_CC: 20/40
OD_GLARE: 20/60
OS_GLARE: 20/40
OS_CC: 20/60-2
OD_CC: 20/20
OS_SC: J2
OD_CC: 20/40+1
OD_CC: 20/25+1
OS_SC: J2+2
OS_CC: 20/60
OS_CC: 20/60
OD_GLARE: 20/60

## 2022-04-02 ASSESSMENT — KERATOMETRY
OD_AXISANGLE_DEGREES: 017
OD_AXISANGLE2_DEGREES: 110
OD_AXISANGLE_DEGREES: 020
OS_AXISANGLE2_DEGREES: 130
OD_K2POWER_DIOPTERS: 46.25
OS_K1POWER_DIOPTERS: 46.50
OS_K2POWER_DIOPTERS: 44.75
OS_AXISANGLE_DEGREES: 040
OD_K2POWER_DIOPTERS: 46.75
OD_AXISANGLE2_DEGREES: 107
OD_K1POWER_DIOPTERS: 44.50
OD_K1POWER_DIOPTERS: 45.00

## 2022-04-02 ASSESSMENT — TONOMETRY
OD_IOP_MMHG: 15
OS_IOP_MMHG: 22
OD_IOP_MMHG: 16
OS_IOP_MMHG: 15
OD_IOP_MMHG: 15
OS_IOP_MMHG: 16
OS_IOP_MMHG: 14
OS_IOP_MMHG: 14
OS_IOP_MMHG: 16
OD_IOP_MMHG: 17
OD_IOP_MMHG: 16
OS_IOP_MMHG: 15
OS_IOP_MMHG: 17
OD_IOP_MMHG: 15
OD_IOP_MMHG: 16
OS_IOP_MMHG: 18
OS_IOP_MMHG: 15
OD_IOP_MMHG: 15
OD_IOP_MMHG: 16
OD_IOP_MMHG: 16
OS_IOP_MMHG: 15
OD_IOP_MMHG: 15
OS_IOP_MMHG: 16

## 2023-01-26 ENCOUNTER — COMPREHENSIVE EXAM (OUTPATIENT)
Dept: URBAN - METROPOLITAN AREA CLINIC 1 | Facility: CLINIC | Age: 78
End: 2023-01-26

## 2023-01-26 DIAGNOSIS — H43.811: ICD-10-CM

## 2023-01-26 DIAGNOSIS — H16.143: ICD-10-CM

## 2023-01-26 DIAGNOSIS — Z96.1: ICD-10-CM

## 2023-01-26 DIAGNOSIS — H40.013: ICD-10-CM

## 2023-01-26 DIAGNOSIS — H04.123: ICD-10-CM

## 2023-01-26 PROCEDURE — 99214 OFFICE O/P EST MOD 30 MIN: CPT

## 2023-01-26 ASSESSMENT — VISUAL ACUITY
OD_CC: 20/25+1
OS_CC: J1
OD_CC: J1
OS_CC: 20/20-1

## 2023-01-26 ASSESSMENT — TONOMETRY
OS_IOP_MMHG: 16
OD_IOP_MMHG: 16

## 2023-01-26 NOTE — PATIENT DISCUSSION
(CD 0.40 OU) IOP stable. Past w/u negative. Patient is considered low risk. Condition was discussed with patient and patient understands. Will continue to monitor patient for any progression in condition. Patient was advised to call us with any problems, questions, or concerns.

## 2024-04-15 ENCOUNTER — COMPREHENSIVE EXAM (OUTPATIENT)
Dept: URBAN - METROPOLITAN AREA CLINIC 1 | Facility: CLINIC | Age: 79
End: 2024-04-15

## 2024-04-15 DIAGNOSIS — H16.143: ICD-10-CM

## 2024-04-15 DIAGNOSIS — H04.123: ICD-10-CM

## 2024-04-15 DIAGNOSIS — H40.013: ICD-10-CM

## 2024-04-15 PROCEDURE — 99214 OFFICE O/P EST MOD 30 MIN: CPT

## 2024-04-15 PROCEDURE — 92133 CPTRZD OPH DX IMG PST SGM ON: CPT

## 2024-04-15 ASSESSMENT — VISUAL ACUITY
OD_SC: 20/25
OS_SC: 20/60-1
OU_SC: J1

## 2024-04-15 ASSESSMENT — TONOMETRY
OD_IOP_MMHG: 13
OS_IOP_MMHG: 12

## 2025-04-18 ENCOUNTER — COMPREHENSIVE EXAM (OUTPATIENT)
Age: 80
End: 2025-04-18

## 2025-04-18 DIAGNOSIS — H16.143: ICD-10-CM

## 2025-04-18 DIAGNOSIS — H40.013: ICD-10-CM

## 2025-04-18 DIAGNOSIS — H04.123: ICD-10-CM

## 2025-04-18 PROCEDURE — 99214 OFFICE O/P EST MOD 30 MIN: CPT
